# Patient Record
Sex: MALE | Race: WHITE | NOT HISPANIC OR LATINO | Employment: FULL TIME | URBAN - NONMETROPOLITAN AREA
[De-identification: names, ages, dates, MRNs, and addresses within clinical notes are randomized per-mention and may not be internally consistent; named-entity substitution may affect disease eponyms.]

---

## 2017-01-16 RX ORDER — LEVETIRACETAM 1000 MG/1
TABLET, FILM COATED ORAL
Qty: 60 TABLET | Refills: 0 | Status: SHIPPED | OUTPATIENT
Start: 2017-01-16 | End: 2017-02-03 | Stop reason: SDUPTHER

## 2017-02-03 ENCOUNTER — OFFICE VISIT (OUTPATIENT)
Dept: NEUROLOGY | Facility: CLINIC | Age: 42
End: 2017-02-03

## 2017-02-03 VITALS
DIASTOLIC BLOOD PRESSURE: 90 MMHG | WEIGHT: 166 LBS | BODY MASS INDEX: 26.68 KG/M2 | SYSTOLIC BLOOD PRESSURE: 128 MMHG | HEIGHT: 66 IN

## 2017-02-03 DIAGNOSIS — G40.109 SEIZURE, TEMPORAL LOBE (HCC): Primary | ICD-10-CM

## 2017-02-03 PROCEDURE — 99213 OFFICE O/P EST LOW 20 MIN: CPT | Performed by: PSYCHIATRY & NEUROLOGY

## 2017-02-03 RX ORDER — LEVETIRACETAM 1000 MG/1
TABLET, FILM COATED ORAL
Qty: 180 TABLET | Refills: 3 | Status: SHIPPED | OUTPATIENT
Start: 2017-02-03 | End: 2017-11-06 | Stop reason: SDUPTHER

## 2017-02-03 RX ORDER — CETIRIZINE HYDROCHLORIDE 10 MG/1
TABLET ORAL
Refills: 11 | COMMUNITY
Start: 2016-12-02

## 2017-02-03 RX ORDER — HYDROCHLOROTHIAZIDE 12.5 MG/1
CAPSULE, GELATIN COATED ORAL AS NEEDED
Refills: 5 | COMMUNITY
Start: 2017-01-03

## 2017-02-03 RX ORDER — LOSARTAN POTASSIUM 100 MG/1
TABLET ORAL AS NEEDED
COMMUNITY
Start: 2016-12-02

## 2017-02-04 NOTE — PROGRESS NOTES
[facility] NEUROLOGY [place] CONSULTATION   History of Present Illness     Date: 2/3/2017    Patient Identification  Iker Falcon is a 41 y.o. male.    Patient information was obtained from patient.  History/Exam limitations: none.    CONSULTATION requested by: Misha Mendez MD    Chief Complaint   Seizures (here for sz f/u. states that he has been sz free approx 2yrs. reports no problems with his keppra.)      Seizures    This is a chronic problem. The problem has been resolved. The most recent episode lasted 2 to 5 minutes. Associated symptoms include sleepiness and confusion. Pertinent negatives include no headaches, no speech difficulty, no visual disturbance, no neck stiffness, no sore throat, no chest pain, no cough, no nausea, no vomiting, no diarrhea and no muscle weakness. Characteristics include rhythmic jerking and loss of consciousness. The episode was witnessed. There was the sensation of an aura present. The seizure(s) had no focality. Possible causes include medication or dosage change and sleep deprivation. There has been no fever.     Patient began having seizure 19 years ago.  Patient reported that his seizure usually preceded by abdominal discomfort followed by loss of consciousness and postictal confusion.  Patient initially treated with Tegretol and we have gradually switching him over to Keppra.  We will continue to wean this patient off from Tegretol.  Since patient had no further seizure we'll continue patient on Keppra 1000 mg twice a day    PMH: No past medical history on file.    Past Surgical History: No past surgical history on file.    Family Hisotry: No family history on file.    Social History:   Social History     Social History   • Marital status:      Spouse name: N/A   • Number of children: N/A   • Years of education: N/A     Occupational History   • Not on file.     Social History Main Topics   • Smoking status: Not on file   • Smokeless tobacco: Not on file   •  Alcohol use Not on file   • Drug use: Not on file   • Sexual activity: Not on file     Other Topics Concern   • Not on file     Social History Narrative       Medications:   Current Outpatient Prescriptions   Medication Sig Dispense Refill   • amLODIPine (NORVASC) 5 MG tablet Take 5 mg by mouth Daily.  5   • carBAMazepine XR (TEGretol  XR) 200 MG 12 hr tablet Take 200 mg by mouth 2 (Two) Times a Day.  6   • cetirizine (zyrTEC) 10 MG tablet   11   • hydrochlorothiazide (MICROZIDE) 12.5 MG capsule As Needed.  5   • KEPPRA 1000 MG tablet One pill twice a day 180 tablet 3   • losartan (COZAAR) 100 MG tablet As Needed.     • losartan-hydrochlorothiazide (HYZAAR) 100-12.5 MG per tablet Take  by mouth Daily.  6     No current facility-administered medications for this visit.        Allergy: No Known Allergies    Review of Systems:  Review of Systems   Constitutional: Negative for chills and fever.   HENT: Negative for congestion, ear pain, hearing loss, rhinorrhea and sore throat.    Eyes: Negative for pain, discharge, redness and visual disturbance.   Respiratory: Negative for cough, shortness of breath, wheezing and stridor.    Cardiovascular: Negative for chest pain, palpitations and leg swelling.   Gastrointestinal: Negative for abdominal pain, constipation, diarrhea, nausea and vomiting.   Endocrine: Negative for cold intolerance, heat intolerance and polyphagia.   Genitourinary: Negative for dysuria, flank pain, frequency and urgency.   Musculoskeletal: Negative for joint swelling, myalgias, neck pain and neck stiffness.   Skin: Negative for pallor, rash and wound.   Allergic/Immunologic: Negative for environmental allergies.   Neurological: Positive for seizures, loss of consciousness and syncope. Negative for dizziness, tremors, facial asymmetry, speech difficulty, weakness, light-headedness, numbness and headaches.   Hematological: Negative for adenopathy.   Psychiatric/Behavioral: Positive for confusion. Negative  "for hallucinations. The patient is not nervous/anxious.        Physical Exam     Vitals:    02/03/17 1550   BP: 128/90   Weight: 166 lb (75.3 kg)   Height: 66\" (167.6 cm)     GENERAL: Patient is pleasant, cooperative, appears to be stated age.  Body habitus is endomorphic.  SKIN AND EXTREMITIES:  No skin rashes or lesions are noted.  No cyanosis, clubbing or edema of the extremities.    HEAD:  Head is normocephalic and atraumatic.    NECK: Neck are non-tender without thyromegaly or adenopathy.  Carotic upstrokes are 1+/4.  No cranial or cervical bruits.  The neck is supple with a full range of motion.   ENT: palate elevate symmetrically, no evidence of high arch palate, tongue midline erythema in posterior pharynx, Mallampati Classification Class III   CARDIOVASCULAR:  Regular rate and rhythm with normal S1 and S2 without rub or gallop.  RESPIRATORY:  Clear to auscultation without wheezes or crackle   ABDOMEN:  Soft and non-tender, positive bowel sound without hepatosplenomegaly  BACK:  Back is straight without midline defect.    PSYCH:  Higher cortical function/mental status:  The patient is alert.  She is oriented x3 to time, place and person.  Recent and the remote memory appear normal.  The patient has a good fund of knowledge.  There is no visual or auditory hallucination or suicidal or homicidal ideation.  SPEECH:There is no gross evidence of aphasia, dysarthria or agnosia.      CRANIAL NERVES:  Pupils are 4mm, equal round reactive to light, reacting briskly to 2mm without afferent pupillary defect.  Visual fields are intact to confrontation testing.  Fundoscopic examination reveals sharp disk margins with normal vasculature.  No papilledema, hemorrhages or exudates.  Extraocular movements are full and smooth with normal pursuits and saccades.  No nystagmus noted.  The face is symmetric. palate elevate symmetrically, Tongue midline, positive gag reflex. The remainder of the cranial nerves are intact and " symmetrical.    MOTOR: Strength is 5/5 throughout with normal tone and bulk with the following exceptions, 4/5 intrinsic muscles of the hands and feet.  No involuntary movements noted.    Deep Tendon Reflexes: are 2/4 and symmetrical in the upper extremities, 2/4 and symmetrical at the knees and 1/4 and symmetrical at the Achilles tendon.  Plantar responses were down-going bilaterally.    SENSATION:  Intact to pinprick, light touch, vibration and proprioception.  Coordination:  The patient normally performs finger-nose-finger, heel-to-knee-to-shin and rapid alternating movements in symmetrical fashion.    COORDINATION AND GAIT:  The patient walks with a narrow-based gait.  Patient is able to heel-toe and tandem walk forward and backwards without difficulty.  Romberg and monopedal  Romberg are negative.    MUSCULOSKELETAL: Range of motion normal, no clubbing, cyanosis, or edema.  No joint swelling.            Records Reviewed: I have personally reviewed his previous medical record.    Iker was seen today for seizures.    Diagnoses and all orders for this visit:    Seizure, temporal lobe    Other orders  -     KEPPRA 1000 MG tablet; One pill twice a day      Treatments:  1.  We'll completely weaning this patient off from Tegretol  2.  Continue patient on Keppra 1000 mg 1 pill twice a day this patient has been seizure-free  3.  With discussed the importance of having regular sleep wake schedule  4.  Avoidance of sleep deprivation  5.  Avoid alcohol  6.  We stressed the importance of compliance with medication  7.  Would  patient as follow  I have counseled patient extensively on epileptic seizure.  Epileptic seizures are a common and important medical problem, with about one in 11 persons experiencing at least one seizure at some point. The management of patients with epilepsy is often challenging, as evidenced by a recent report that over one half of all patients with epilepsy continue to experience at least  occasional seizures despite treatment with antiepileptic medications.   We have also discussed various diagnostic testing.  Diagnostic studies must be tailored to this particular patient. Basic laboratory evaluation focuses on detecting systemic disturbances potentially associated with seizures were explored.  The imaging modality of choice is magnetic resonance imaging (MRI). Electroencephalography (EEG) is helpful in the evaluation of this patient. The utility of EEG includes detection of epileptiform activity, strengthening the putative diagnosis; identification of focal electrocerebral abnormalities suggesting a focal structural brain lesion; and documentation of specific epileptiform patterns associated with particular epilepsy syndromes would be very beneficial.   For patients with relatively infrequent seizures (in whom it is difficult to gauge the response to treatment without waiting many months for the next seizure to occur), a logical approach is to promptly increase the medication to the maximum tolerated dosage and maintain it at this level. This can be accomplished by increasing the agent until the patient begins to experience expected dose-related side effects, and then reducing the dosage to the immediately previous dosage that did not produce the adverse effects. Once a steady state with the refined dosage has been achieved, it is useful to check the trough drug serum level as a reference point for the maximum tolerated dosage.  If the patient eventually experiences a seizure when the serum level is at the reference point, the trial of medication can be considered a failure. This procedure enables assessment of efficacy in a manner significantly more efficient than simply beginning at an average dosage and waiting for the next seizure before the next increase is implemented. If adequate seizure control-which should be defined as complete seizure control for most patients-is not achieved at the  maximum tolerated dosage of the first medication, consideration should be given to referring the patient for neurologic consultation, if this has not yet been undertaken. Usually, a second agent will need to be added.      This Document is signed by Marco A Temple MD, FAAN, FAASM February 3, 14034:27 PM

## 2017-03-01 DIAGNOSIS — G40.109 SEIZURE, TEMPORAL LOBE (HCC): Primary | ICD-10-CM

## 2017-03-01 RX ORDER — CARBAMAZEPINE 200 MG/1
200 TABLET, EXTENDED RELEASE ORAL 2 TIMES DAILY
Qty: 60 TABLET | Refills: 2 | Status: SHIPPED | OUTPATIENT
Start: 2017-03-01 | End: 2017-07-26 | Stop reason: SDUPTHER

## 2017-03-01 NOTE — TELEPHONE ENCOUNTER
Pt's wife called stating that he was having the weird sensation in his stomach again. Wants to know if Dr. Temple wants to increase the Tegretol  mg to one tablet twice a day or increase his Keppra.    Per Dr. Temple, increase the Tegretol  mg to one tablet BID due to the pt being close to the max dose on Keppra.    Sent to [facility] in [place].

## 2017-07-26 DIAGNOSIS — G40.109 SEIZURE, TEMPORAL LOBE (HCC): ICD-10-CM

## 2017-07-27 RX ORDER — CARBAMAZEPINE 200 MG/1
TABLET, EXTENDED RELEASE ORAL
Qty: 60 TABLET | Refills: 2 | Status: SHIPPED | OUTPATIENT
Start: 2017-07-27 | End: 2017-09-19 | Stop reason: SDUPTHER

## 2017-09-07 DIAGNOSIS — G40.109 SEIZURE, TEMPORAL LOBE (HCC): ICD-10-CM

## 2017-09-07 RX ORDER — CARBAMAZEPINE 200 MG/1
TABLET, EXTENDED RELEASE ORAL
Qty: 60 TABLET | Refills: 2 | OUTPATIENT
Start: 2017-09-07

## 2017-09-19 DIAGNOSIS — G40.109 SEIZURE, TEMPORAL LOBE (HCC): ICD-10-CM

## 2017-09-23 RX ORDER — CARBAMAZEPINE 200 MG/1
TABLET, EXTENDED RELEASE ORAL
Qty: 60 TABLET | Refills: 2 | Status: SHIPPED | OUTPATIENT
Start: 2017-09-23

## 2017-11-06 RX ORDER — LEVETIRACETAM 1000 MG/1
TABLET ORAL
Qty: 60 TABLET | Refills: 0 | Status: SHIPPED | OUTPATIENT
Start: 2017-11-06

## 2019-09-07 NOTE — TELEPHONE ENCOUNTER
Pt last office note from 02/2017 states pt is to be weaned off Tegretol. Pt was No Show for follow up appt. Does pt need to be seen before this medication can be refilled? No appt scheduled at this time.   
None known

## 2023-04-21 ENCOUNTER — OFFICE VISIT (OUTPATIENT)
Dept: CARDIOLOGY | Facility: CLINIC | Age: 48
End: 2023-04-21
Payer: COMMERCIAL

## 2023-04-21 VITALS
OXYGEN SATURATION: 97 % | SYSTOLIC BLOOD PRESSURE: 124 MMHG | DIASTOLIC BLOOD PRESSURE: 64 MMHG | BODY MASS INDEX: 28.77 KG/M2 | WEIGHT: 179 LBS | HEIGHT: 66 IN | HEART RATE: 53 BPM

## 2023-04-21 DIAGNOSIS — G89.29 CHRONIC CHEST PAIN WITH LOW TO MODERATE RISK FOR CAD: Primary | ICD-10-CM

## 2023-04-21 DIAGNOSIS — R07.9 CHRONIC CHEST PAIN WITH LOW TO MODERATE RISK FOR CAD: Primary | ICD-10-CM

## 2023-04-21 DIAGNOSIS — Z91.89 CHRONIC CHEST PAIN WITH LOW TO MODERATE RISK FOR CAD: Primary | ICD-10-CM

## 2023-04-21 RX ORDER — FAMOTIDINE 40 MG/1
40 TABLET, FILM COATED ORAL
COMMUNITY
Start: 2023-04-07

## 2023-04-21 RX ORDER — TERBINAFINE HYDROCHLORIDE 250 MG/1
1 TABLET ORAL DAILY
COMMUNITY
Start: 2023-03-15

## 2023-04-21 RX ORDER — ROSUVASTATIN CALCIUM 10 MG/1
1 TABLET, COATED ORAL DAILY
COMMUNITY
Start: 2023-04-07

## 2023-04-21 RX ORDER — METOPROLOL SUCCINATE 100 MG/1
100 TABLET, EXTENDED RELEASE ORAL
COMMUNITY
Start: 2023-04-07

## 2023-04-21 NOTE — PROGRESS NOTES
"[facility]  INITIAL OFFICE CONSULT      Iker Falcon  1975  PCP: Misha Nevarez MD    SUBJECTIVE:   Iker Falcon is a 48 y.o. 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  The patient is states he has had chest pain off and on for the past year.  He reports that he is concerned about cardiac disease as he has a strong family history including his father had an MI at a young age.  He describes his chest pain as sharp knifelike on the left side of his chest sometimes rating through to his back in the left scapular area.  He states the symptoms can come and go randomly in nature and are not particular source exertion.  A few weeks ago he was sitting in [employer] and had an episode that became quite severe.  This was associated with some shortness of breath nausea and vasovagal type symptoms.  He states however resolved the following couple days he has been back to work and doing physical activities without any symptoms.  However because of these symptoms and his history with his family as well as his multiple risk factors his family physician has requested that he be evaluated further from a cardiac standpoint.    Cardiac PMH: (Old records have been reviewed and summarized below)  1. Chest pain  2. HTN: Controlled on amlodipine, losartan therapy, and Toprol therapy.  3. HLD: Crestor therapy.  4. 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 None in 25 years  5. Family history-CAD Father 50 years , MI.   6. Glucose intolerance, \"Border line DM\"  7. Tobacco abuse(Quit 27 age years " old)        Past Medical History, Past Surgical History, Family history, Social History, and Medications were all reviewed with the patient today and updated as necessary.     Current Outpatient Medications   Medication Sig Dispense Refill   • carBAMazepine XR (TEGretol  XR) 200 MG 12 hr tablet TAKE 1 TABLET TWICE DAILY 60 tablet 2   • cetirizine (zyrTEC) 10 MG tablet   11   • famotidine (PEPCID) 40 MG tablet Take 1 tablet by mouth every night at bedtime.     • hydrochlorothiazide (MICROZIDE) 12.5 MG capsule As Needed.  5   • levETIRAcetam (KEPPRA) 1000 MG tablet TAKE 1 TABLET TWICE DAILY 60 tablet 0   • losartan (COZAAR) 100 MG tablet As Needed.     • metoprolol succinate XL (TOPROL-XL) 100 MG 24 hr tablet Take 1 tablet by mouth every night at bedtime.     • rosuvastatin (CRESTOR) 10 MG tablet Take 1 tablet by mouth Daily.     • terbinafine (lamiSIL) 250 MG tablet Take 1 tablet by mouth Daily.     • amLODIPine (NORVASC) 5 MG tablet Take 1 tablet by mouth Daily. (Patient not taking: Reported on 2023)  5     No current facility-administered medications for this visit.     No Known Allergies      Past Medical History:   Diagnosis Date   • Hyperlipidemia    • Hypertension      Past Surgical History:   Procedure Laterality Date   • APPENDECTOMY     • HAND SURGERY Left      Family History   Problem Relation Age of Onset   • Hypertension Mother    • Hyperlipidemia Father    • Hypertension Father    • Diabetes Father    • Heart attack Father      Social History     Tobacco Use   • Smoking status: Former     Types: Cigarettes     Quit date:      Years since quittin.3   • Smokeless tobacco: Never   Substance Use Topics   • Alcohol use: Yes     Comment: occasional       ROS:  Review of Symptoms:  General: no recent weight loss/gain, weakness or fatigue  Skin: no rashes, lumps, or other skin changes  HEENT: no dizziness, lightheadedness, or vision changes  Respiratory: no cough or hemoptysis  Cardiovascular: no  "palpitations, and tachycardia  Gastrointestinal: no black/tarry stools or diarrhea  Urinary: no change in frequency or urgency  Peripheral Vascular: no claudication or leg cramps  Musculoskeletal: no muscle or joint pain/stiffness  Psychiatric: no depression or excessive stress  Neurological: no sensory or motor loss, no syncope  Hematologic: no anemia, easy bruising or bleeding  Endocrine: no thyroid problems, nor heat or cold intolerance         PHYSICAL EXAM:   /64 (BP Location: Right arm, Patient Position: Sitting)   Pulse 53   Ht 167.6 cm (66\")   Wt 81.2 kg (179 lb)   SpO2 97%   BMI 28.89 kg/m²      Wt Readings from Last 5 Encounters:   04/21/23 81.2 kg (179 lb)   02/03/17 75.3 kg (166 lb)   10/28/16 75.3 kg (166 lb)     BP Readings from Last 5 Encounters:   04/21/23 124/64   02/03/17 128/90   10/28/16 140/86       General-Well Nourished, Well developed  Eyes - PERRLA  Neck- supple, No mass  CV- regular rate and rhythm, no MRG  Lung- clear bilaterally  Abd- soft, +BS  Musc/skel - Norm strength and range of motion  Skin- warm and dry  Neuro - Alert & Oriented x 3, appropriate mood.    Patient's external notes were reviewed.  Independent interpretation of test performed by another physician in facility were reviewed.  Outside laboratory data was also reviewed.    Medical problems and test results were reviewed with the patient today.       EKG:  (EKG/Tracing has been independently visualized by me and summarized below)      ECG 12 Lead    Date/Time: 4/21/2023 4:07 PM  Performed by: Salvatore Willard PA  Authorized by: Salvatore Willard PA   Comparison: not compared with previous ECG   Previous ECG: no previous ECG available  Rhythm: sinus rhythm  Rate: bradycardic  Conduction: conduction normal  ST Segments: ST segments normal  T Waves: T waves normal  QRS axis: normal  Other: no other findings    Clinical impression: normal ECG            ASSESSMENT   1. Chest pain-atypical features for angina " pectoris.  2. HTN: Controlled on 4 different medications including amlodipine, hydrochlorothiazide, losartan, Toprol  3. HLD: Crestor therapy  4. Family history of CAD.       PLAN  · GXT stress test  · Echocardiogram regarding shortness of breath  · Coronary calcium score test  · Continue focus on risk factor modification  · Diet exercise weight loss.  · Return follow-up or office in 2 3 months or sooner as needed.  He is instructed to go to the ER if he has recurrent symptoms like this and they are sustained in nature.        Cardiology/Electrophysiology  04/21/23  16:04 EDT  Electronically signed by FUNMI Ferrara, 04/21/23, 4:11 PM EDT.

## 2023-09-26 RX ORDER — METOPROLOL SUCCINATE 50 MG/1
100 TABLET, EXTENDED RELEASE ORAL
Qty: 60 TABLET | Refills: 6 | Status: SHIPPED | OUTPATIENT
Start: 2023-09-26

## 2024-05-14 RX ORDER — METOPROLOL SUCCINATE 50 MG/1
TABLET, EXTENDED RELEASE ORAL
Qty: 60 TABLET | Refills: 6 | Status: SHIPPED | OUTPATIENT
Start: 2024-05-14

## 2024-12-10 RX ORDER — METOPROLOL SUCCINATE 50 MG/1
100 TABLET, EXTENDED RELEASE ORAL
Qty: 60 TABLET | Refills: 0 | Status: SHIPPED | OUTPATIENT
Start: 2024-12-10

## 2024-12-18 RX ORDER — METOPROLOL SUCCINATE 50 MG/1
TABLET, EXTENDED RELEASE ORAL
Qty: 60 TABLET | Refills: 0 | OUTPATIENT
Start: 2024-12-18

## 2025-01-07 RX ORDER — METOPROLOL SUCCINATE 50 MG/1
TABLET, EXTENDED RELEASE ORAL
Qty: 60 TABLET | Refills: 0 | Status: SHIPPED | OUTPATIENT
Start: 2025-01-07

## 2025-01-13 RX ORDER — METOPROLOL SUCCINATE 50 MG/1
TABLET, EXTENDED RELEASE ORAL
Qty: 60 TABLET | Refills: 0 | OUTPATIENT
Start: 2025-01-13

## 2025-02-11 RX ORDER — METOPROLOL SUCCINATE 50 MG/1
TABLET, EXTENDED RELEASE ORAL
Qty: 60 TABLET | Refills: 0 | OUTPATIENT
Start: 2025-02-11

## 2025-02-11 NOTE — TELEPHONE ENCOUNTER
Patient needs to make appt for additional refills or contact PCP to refill medication.    Attempted to call patient. No answer.

## 2025-02-12 RX ORDER — METOPROLOL SUCCINATE 50 MG/1
TABLET, EXTENDED RELEASE ORAL
Qty: 60 TABLET | Refills: 0 | OUTPATIENT
Start: 2025-02-12

## 2025-02-25 RX ORDER — METOPROLOL SUCCINATE 50 MG/1
TABLET, EXTENDED RELEASE ORAL
Qty: 60 TABLET | Refills: 0 | OUTPATIENT
Start: 2025-02-25

## 2025-02-26 RX ORDER — METOPROLOL SUCCINATE 50 MG/1
TABLET, EXTENDED RELEASE ORAL
Qty: 60 TABLET | Refills: 0 | OUTPATIENT
Start: 2025-02-26

## 2025-03-11 RX ORDER — METOPROLOL SUCCINATE 50 MG/1
TABLET, EXTENDED RELEASE ORAL
Qty: 60 TABLET | Refills: 0 | OUTPATIENT
Start: 2025-03-11

## 2025-03-17 RX ORDER — METOPROLOL SUCCINATE 50 MG/1
TABLET, EXTENDED RELEASE ORAL
Qty: 60 TABLET | Refills: 0 | OUTPATIENT
Start: 2025-03-17

## 2025-03-27 RX ORDER — METOPROLOL SUCCINATE 50 MG/1
TABLET, EXTENDED RELEASE ORAL
Qty: 60 TABLET | Refills: 0 | Status: SHIPPED | OUTPATIENT
Start: 2025-03-27